# Patient Record
Sex: MALE | Race: WHITE | NOT HISPANIC OR LATINO | URBAN - METROPOLITAN AREA
[De-identification: names, ages, dates, MRNs, and addresses within clinical notes are randomized per-mention and may not be internally consistent; named-entity substitution may affect disease eponyms.]

---

## 2020-10-15 ENCOUNTER — OFFICE (OUTPATIENT)
Dept: URBAN - METROPOLITAN AREA TELEHEALTH 12 | Facility: TELEHEALTH | Age: 70
End: 2020-10-15
Payer: COMMERCIAL

## 2020-10-15 VITALS — HEIGHT: 66 IN | WEIGHT: 197 LBS

## 2020-10-15 DIAGNOSIS — E87.1 HYPO-OSMOLALITY AND HYPONATREMIA: ICD-10-CM

## 2020-10-15 DIAGNOSIS — K74.60 UNSPECIFIED CIRRHOSIS OF LIVER: ICD-10-CM

## 2020-10-15 DIAGNOSIS — D69.6 THROMBOCYTOPENIA, UNSPECIFIED: ICD-10-CM

## 2020-10-15 DIAGNOSIS — F10.21 ALCOHOL DEPENDENCE, IN REMISSION: ICD-10-CM

## 2020-10-15 PROCEDURE — 99204 OFFICE O/P NEW MOD 45 MIN: CPT | Performed by: INTERNAL MEDICINE

## 2020-10-15 NOTE — SERVICEHPINOTES
PATIENT VERIFIED BY DATE OF BIRTH AND NAME. Patient has been consented for this telecommunication visit.   ANNALISA PEREZ   is a   69   year old male who is being seen in consultation at the request of   REBECCA KUMAR   for new finding of liver cirrhosis.  He has long history of EtOH use from age 16 to about 5 years ago when he was hospitalized with pancreatitis.  He has been sober since.  He has never smoked or any history of drug use.  He has never had problems with his liver.  BRFrom review of his records his plts were 133 (Feb 25th) to 137 range.  Hgb 12.8 (2/25) to almost normal in Sept.  Sodium was 134 on 9/18.  Hep BsAg neg, HCV Ab neg.  WNL LFTs.  He was instructed to eat more salty foods.  He denies peripheral edema or increased girth. No confusion or problems sleeping. He has no bruising or bleeding.  No changes in bowels, n/v or any GI complaints.